# Patient Record
Sex: FEMALE | Race: WHITE | URBAN - NONMETROPOLITAN AREA
[De-identification: names, ages, dates, MRNs, and addresses within clinical notes are randomized per-mention and may not be internally consistent; named-entity substitution may affect disease eponyms.]

---

## 2023-09-13 ENCOUNTER — OFFICE VISIT (OUTPATIENT)
Dept: URBAN - NONMETROPOLITAN AREA CLINIC 2 | Facility: CLINIC | Age: 53
End: 2023-09-13

## 2023-09-27 ENCOUNTER — LAB OUTSIDE AN ENCOUNTER (OUTPATIENT)
Dept: URBAN - NONMETROPOLITAN AREA CLINIC 2 | Facility: CLINIC | Age: 53
End: 2023-09-27

## 2023-09-27 ENCOUNTER — OFFICE VISIT (OUTPATIENT)
Dept: URBAN - NONMETROPOLITAN AREA CLINIC 13 | Facility: CLINIC | Age: 53
End: 2023-09-27
Payer: COMMERCIAL

## 2023-09-27 ENCOUNTER — LAB OUTSIDE AN ENCOUNTER (OUTPATIENT)
Dept: URBAN - NONMETROPOLITAN AREA CLINIC 13 | Facility: CLINIC | Age: 53
End: 2023-09-27

## 2023-09-27 ENCOUNTER — WEB ENCOUNTER (OUTPATIENT)
Dept: URBAN - NONMETROPOLITAN AREA CLINIC 13 | Facility: CLINIC | Age: 53
End: 2023-09-27

## 2023-09-27 VITALS
WEIGHT: 127.3 LBS | TEMPERATURE: 98.6 F | BODY MASS INDEX: 23.42 KG/M2 | HEIGHT: 62 IN | SYSTOLIC BLOOD PRESSURE: 110 MMHG | DIASTOLIC BLOOD PRESSURE: 70 MMHG | HEART RATE: 82 BPM

## 2023-09-27 DIAGNOSIS — K51.218 ULCERATIVE PROCTITIS WITH OTHER COMPLICATION: ICD-10-CM

## 2023-09-27 DIAGNOSIS — E53.8 VITAMIN B12 DEFICIENCY: ICD-10-CM

## 2023-09-27 DIAGNOSIS — Z12.11 COLON CANCER SCREENING: ICD-10-CM

## 2023-09-27 PROBLEM — 190634004: Status: ACTIVE | Noted: 2023-09-27

## 2023-09-27 PROCEDURE — 99204 OFFICE O/P NEW MOD 45 MIN: CPT | Performed by: NURSE PRACTITIONER

## 2023-09-27 PROCEDURE — 99244 OFF/OP CNSLTJ NEW/EST MOD 40: CPT | Performed by: NURSE PRACTITIONER

## 2023-09-27 RX ORDER — MESALAMINE 1.2 G/1
2 TABLETS WITH A MEAL TABLET, DELAYED RELEASE ORAL ONCE A DAY
Qty: 180 TABLET | Refills: 3

## 2023-09-27 RX ORDER — SODIUM PICOSULFATE, MAGNESIUM OXIDE, AND ANHYDROUS CITRIC ACID 12; 3.5; 1 G/175ML; G/175ML; MG/175ML
175 ML THE FIRST DOSE THE EVENING BEFORE AND SECOND DOSE THE MORNING OF COLONOSCOPY LIQUID ORAL ONCE A DAY
Qty: 350 MILLILITER | Refills: 0 | OUTPATIENT
Start: 2023-09-27 | End: 2023-09-29

## 2023-09-27 NOTE — HPI-TODAY'S VISIT:
9/27/2023 Stefania is a 53-year-old female referred to me by Dr. Iggy Roger for evaluation of history of ulcerative proctitis/colitis and elevated bilirubin.  A copy this note be sent to the referring physician.  In 2014 she had a incomplete colonoscopy by Dr. Navarro due to lack of ability to sedate her with proctitis.  Her biopsies show chronicity and she was diagnosed with ulcerative colitis/ulcerative proctitis.  She was recommended to start mesalamine.  She did not follow-up with Dr. Navarro and her mother works for Dr. Jenkins so he did her last 2 colonoscopies.  Her last one was done around 5 years ago normal.  She is on mesalamine 2 pills daily.  She had no flares of her proctitis with no bleeding or pain.  She has alternating constipation and diarrhea at times.  She is predominantly constipated.  She states this is her baseline and she does not take anything regularly for this.  Recently her labs show B12 deficiency and elevated T bilirubin.  She had repeat labs done with normal bilirubin, she has not had any abdominal imaging.  Today she is here to follow-up on ulcerative colitis/proctitis and elevated T bilirubin.  MB

## 2023-09-28 LAB
ALBUMIN: 4.3
BASO (ABSOLUTE): 0
BASOS: 0
BUN/CREATININE RATIO: 21
BUN: 15
CALCIUM: 9.2
CARBON DIOXIDE, TOTAL: 23
CHLORIDE: 102
CREATININE: 0.71
EGFR: 102
EOS (ABSOLUTE): 0.1
EOS: 2
GLUCOSE: 76
HEMATOCRIT: 38.6
HEMATOLOGY COMMENTS:: (no result)
HEMOGLOBIN: 12.3
IMMATURE CELLS: (no result)
IMMATURE GRANS (ABS): 0
IMMATURE GRANULOCYTES: 0
LYMPHS (ABSOLUTE): 1.9
LYMPHS: 36
Lab: (no result)
MCH: 28.7
MCHC: 31.9
MCV: 90
MONOCYTES(ABSOLUTE): 0.5
MONOCYTES: 10
NEUTROPHILS (ABSOLUTE): 2.9
NEUTROPHILS: 52
NRBC: (no result)
PHOSPHORUS: 3.7
PLATELETS: 297
POTASSIUM: 5
RBC: 4.28
RDW: 13.4
SODIUM: 139
WBC: 5.4

## 2023-10-03 ENCOUNTER — TELEPHONE ENCOUNTER (OUTPATIENT)
Dept: URBAN - NONMETROPOLITAN AREA CLINIC 2 | Facility: CLINIC | Age: 53
End: 2023-10-03

## 2023-10-10 ENCOUNTER — LAB OUTSIDE AN ENCOUNTER (OUTPATIENT)
Dept: URBAN - NONMETROPOLITAN AREA CLINIC 2 | Facility: CLINIC | Age: 53
End: 2023-10-10

## 2023-10-11 LAB
ALBUMIN/GLOBULIN RATIO: 1.8
ALBUMIN: 3.9
ALKALINE PHOSPHATASE: 93
ALT (SGPT): 25
AST (SGOT): 29
BILIRUBIN, DIRECT: 0.2
BILIRUBIN, INDIRECT: 0.7
BILIRUBIN, TOTAL: 0.9
GLOBULIN: 2.2
PROTEIN, TOTAL: 6.1

## 2023-10-31 ENCOUNTER — TELEPHONE ENCOUNTER (OUTPATIENT)
Dept: URBAN - NONMETROPOLITAN AREA CLINIC 2 | Facility: CLINIC | Age: 53
End: 2023-10-31

## 2024-01-04 ENCOUNTER — LAB OUTSIDE AN ENCOUNTER (OUTPATIENT)
Dept: URBAN - NONMETROPOLITAN AREA CLINIC 2 | Facility: CLINIC | Age: 54
End: 2024-01-04

## 2024-01-04 ENCOUNTER — OFFICE VISIT (OUTPATIENT)
Dept: URBAN - METROPOLITAN AREA MEDICAL CENTER 1 | Facility: MEDICAL CENTER | Age: 54
End: 2024-01-04
Payer: COMMERCIAL

## 2024-01-04 DIAGNOSIS — K51.50 CHRONIC LEFT-SIDED ULCERATIVE COLITIS: ICD-10-CM

## 2024-01-04 PROCEDURE — 45380 COLONOSCOPY AND BIOPSY: CPT | Performed by: INTERNAL MEDICINE

## 2024-01-05 LAB
AP CASE REPORT: (no result)
AP FINAL DIAGNOSIS: (no result)
AP GROSS DESCRIPTION: (no result)
AP MICROSCOPIC DESCRIPTION: (no result)

## 2024-02-05 ENCOUNTER — OV EP (OUTPATIENT)
Dept: URBAN - NONMETROPOLITAN AREA CLINIC 2 | Facility: CLINIC | Age: 54
End: 2024-02-05
Payer: COMMERCIAL

## 2024-02-05 VITALS
DIASTOLIC BLOOD PRESSURE: 76 MMHG | TEMPERATURE: 98.7 F | BODY MASS INDEX: 28.71 KG/M2 | WEIGHT: 156 LBS | HEIGHT: 62 IN | SYSTOLIC BLOOD PRESSURE: 119 MMHG | HEART RATE: 93 BPM

## 2024-02-05 DIAGNOSIS — K80.20 GALLSTONES: ICD-10-CM

## 2024-02-05 DIAGNOSIS — E53.8 VITAMIN B12 DEFICIENCY: ICD-10-CM

## 2024-02-05 DIAGNOSIS — R17 ELEVATED BILIRUBIN: ICD-10-CM

## 2024-02-05 DIAGNOSIS — K51.218 ULCERATIVE PROCTITIS WITH OTHER COMPLICATION: ICD-10-CM

## 2024-02-05 DIAGNOSIS — Z12.11 COLON CANCER SCREENING: ICD-10-CM

## 2024-02-05 PROBLEM — 26165005: Status: ACTIVE | Noted: 2023-09-27

## 2024-02-05 PROBLEM — 235919008: Status: ACTIVE | Noted: 2024-02-05

## 2024-02-05 PROBLEM — 305058001: Status: ACTIVE | Noted: 2023-09-27

## 2024-02-05 PROBLEM — 3951002: Status: ACTIVE | Noted: 2023-09-27

## 2024-02-05 PROCEDURE — 99214 OFFICE O/P EST MOD 30 MIN: CPT | Performed by: NURSE PRACTITIONER

## 2024-02-05 RX ORDER — MESALAMINE 1.2 G/1
4 TABLETS TABLET, DELAYED RELEASE ORAL ONCE A DAY
Qty: 360 TABLET | Refills: 3
End: 2025-01-30

## 2024-02-05 RX ORDER — MESALAMINE 1.2 G/1
2 TABLETS WITH A MEAL TABLET, DELAYED RELEASE ORAL ONCE A DAY
Qty: 180 TABLET | Refills: 3 | Status: ON HOLD | COMMUNITY

## 2024-02-05 RX ORDER — MESALAMINE 1000 MG/1
1 SUPPOSITORY AT BEDTIME SUPPOSITORY RECTAL ONCE A DAY
Qty: 30 SUPPOSITORY | Refills: 3 | OUTPATIENT
Start: 2024-02-05 | End: 2024-06-04

## 2024-02-05 NOTE — HPI-TODAY'S VISIT:
9/27/2023 Stefania is a 53-year-old female referred to me by Dr. Iggy Roger for evaluation of history of ulcerative proctitis/colitis and elevated bilirubin.  A copy this note be sent to the referring physician.  In 2014 she had a incomplete colonoscopy by Dr. Navarro due to lack of ability to sedate her with proctitis.  Her biopsies show chronicity and she was diagnosed with ulcerative colitis/ulcerative proctitis.  She was recommended to start mesalamine.  She did not follow-up with Dr. Navarro and her mother works for Dr. Jenkins so he did her last 2 colonoscopies.  Her last one was done around 5 years ago normal.  She is on mesalamine 2 pills daily.  She had no flares of her proctitis with no bleeding or pain.  She has alternating constipation and diarrhea at times.  She is predominantly constipated.  She states this is her baseline and she does not take anything regularly for this.  Recently her labs show B12 deficiency and elevated T bilirubin.  She had repeat labs done with normal bilirubin, she has not had any abdominal imaging.  Today she is here to follow-up on ulcerative colitis/proctitis and elevated T bilirubin.  MB 2/5/2024 The patient presents for colonoscopy follow-up.  Since her last visit her repeat labs are normal bilirubin, her ultrasound shows gallstones.  Her colonoscopy was normal with normal biopsies.  She stopped her mesalamine after her procedure and now she has tenesmus bleeding and mucus.  She agrees to restart low-dose mesalamine and Canasa nightly.  She has no symptoms of her gallstones.  She does she is flaring with her ulcerative proctitis but otherwise doing fairly well.  MB

## 2024-02-05 NOTE — PHYSICAL EXAM MUSCULOSKELETAL:
Marcial Vaca,I'm not sure if you are the right person for this but this pt needs a thyroid ultrasound and if they think it is warranted then she should have an FNA. I am not sure if I ordered this right. Thanks! normal gait and station , full range of motion

## 2024-02-06 LAB
ALBUMIN: 3.9
ALKALINE PHOSPHATASE: 78
ALT (SGPT): 6
AST (SGOT): 12
BASO (ABSOLUTE): 0
BASOS: 1
BILIRUBIN, DIRECT: 0.16
BILIRUBIN, TOTAL: 0.7
BUN/CREATININE RATIO: 25
BUN: 15
CARBON DIOXIDE, TOTAL: 22
CHLORIDE: 104
CREATININE: 0.59
EGFR: 108
EOS (ABSOLUTE): 0.1
EOS: 2
GLUCOSE: 193
HEMATOCRIT: 38.5
HEMATOLOGY COMMENTS:: (no result)
HEMOGLOBIN: 11.8
IMMATURE CELLS: (no result)
IMMATURE GRANS (ABS): 0
IMMATURE GRANULOCYTES: 0
LYMPHS (ABSOLUTE): 1.2
LYMPHS: 26
MCH: 26.8
MCHC: 30.6
MCV: 87
MONOCYTES(ABSOLUTE): 0.4
MONOCYTES: 8
NEUTROPHILS (ABSOLUTE): 3
NEUTROPHILS: 63
NRBC: (no result)
PLATELETS: 336
POTASSIUM: 5
PROTEIN, TOTAL: 5.9
RBC: 4.41
RDW: 13.7
SODIUM: 140
WBC: 4.8

## 2024-05-08 ENCOUNTER — OFFICE VISIT (OUTPATIENT)
Dept: URBAN - NONMETROPOLITAN AREA SURGERY CENTER 1 | Facility: SURGERY CENTER | Age: 54
End: 2024-05-08

## 2024-07-01 ENCOUNTER — OFFICE VISIT (OUTPATIENT)
Dept: URBAN - NONMETROPOLITAN AREA CLINIC 2 | Facility: CLINIC | Age: 54
End: 2024-07-01
Payer: COMMERCIAL

## 2024-07-01 ENCOUNTER — DASHBOARD ENCOUNTERS (OUTPATIENT)
Age: 54
End: 2024-07-01

## 2024-07-01 VITALS
WEIGHT: 146 LBS | TEMPERATURE: 97.1 F | HEIGHT: 62 IN | DIASTOLIC BLOOD PRESSURE: 64 MMHG | HEART RATE: 78 BPM | BODY MASS INDEX: 26.87 KG/M2 | SYSTOLIC BLOOD PRESSURE: 99 MMHG

## 2024-07-01 DIAGNOSIS — E53.8 VITAMIN B12 DEFICIENCY: ICD-10-CM

## 2024-07-01 DIAGNOSIS — K80.20 GALLSTONES: ICD-10-CM

## 2024-07-01 DIAGNOSIS — K51.218 ULCERATIVE PROCTITIS WITH OTHER COMPLICATION: ICD-10-CM

## 2024-07-01 DIAGNOSIS — R17 ELEVATED BILIRUBIN: ICD-10-CM

## 2024-07-01 PROCEDURE — 99214 OFFICE O/P EST MOD 30 MIN: CPT | Performed by: NURSE PRACTITIONER

## 2024-07-01 RX ORDER — MESALAMINE 1.2 G/1
4 TABLETS TABLET, DELAYED RELEASE ORAL ONCE A DAY
Qty: 360 TABLET | Refills: 3 | Status: ACTIVE | COMMUNITY
End: 2025-01-30

## 2024-07-01 NOTE — HPI-TODAY'S VISIT:
9/27/2023 Stefania is a 53-year-old female referred to me by Dr. Iggy Roger for evaluation of history of ulcerative proctitis/colitis and elevated bilirubin.  A copy this note be sent to the referring physician.  In 2014 she had a incomplete colonoscopy by Dr. Navarro due to lack of ability to sedate her with proctitis.  Her biopsies show chronicity and she was diagnosed with ulcerative colitis/ulcerative proctitis.  She was recommended to start mesalamine.  She did not follow-up with Dr. Navarro and her mother works for Dr. Jenkins so he did her last 2 colonoscopies.  Her last one was done around 5 years ago normal.  She is on mesalamine 2 pills daily.  She had no flares of her proctitis with no bleeding or pain.  She has alternating constipation and diarrhea at times.  She is predominantly constipated.  She states this is her baseline and she does not take anything regularly for this.  Recently her labs show B12 deficiency and elevated T bilirubin.  She had repeat labs done with normal bilirubin, she has not had any abdominal imaging.  Today she is here to follow-up on ulcerative colitis/proctitis and elevated T bilirubin.  MB 2/5/2024 The patient presents for colonoscopy follow-up.  Since her last visit her repeat labs are normal bilirubin, her ultrasound shows gallstones.  Her colonoscopy was normal with normal biopsies.  She stopped her mesalamine after her procedure and now she has tenesmus bleeding and mucus.  She agrees to restart low-dose mesalamine and Canasa nightly.  She has no symptoms of her gallstones.  She does she is flaring with her ulcerative proctitis but otherwise doing fairly well.  MB 7/1/2024 Stefania presents for follow-up of UC and [place].  Today she is doing great.  She is on 2 mesalamine daily.  Her bowels are moving regularly.  Her repeat labs are stable.  Today she denies any new GI complaints.  MB

## 2024-07-01 NOTE — PHYSICAL EXAM MUSCULOSKELETAL:
The abscess that was drained today will heal from the bottom up and sides inward.  There are no stitches.    You should not take baths while this is healing.  Showers are encouraged.  Gently clean the area once a day when you shower, using mild soap and water.  If you have a detachable showerhead, feel free to spray the wound with it.    Additionally, you should use a peribottle 2-3 times per day.  Fill it with warm water and spray the area, generally each time you go to the bathroom.  This will clean the wound and should also feel soothing.  Pat the area dry.    Don't apply any topical ointments, creams, or powders to the wound. (You can apply plain vaseline to the biopsy on the left side)    Blood-tinged, pus-like drainage is normal and a good sign that the abscess is draining.    If your doctor prescribed antibiotics, take them as prescribed until they're all gone, even if you feel as though the infection is gone before you finish the medicine.    You should go to the ER for extreme pain, skin that is turning black, foul odor, worsening swelling / redness, or fever.    Otherwise please keep your follow-up appointments with our clinic and contact us in the meantime with questions or concerns.     normal gait and station , full range of motion

## 2024-07-02 ENCOUNTER — TELEPHONE ENCOUNTER (OUTPATIENT)
Dept: URBAN - NONMETROPOLITAN AREA CLINIC 2 | Facility: CLINIC | Age: 54
End: 2024-07-02

## 2024-07-02 LAB
ALBUMIN: 4.3
ALKALINE PHOSPHATASE: 71
ALT (SGPT): 7
AST (SGOT): 13
BASO (ABSOLUTE): 0
BASOS: 1
BILIRUBIN, DIRECT: 0.24
BILIRUBIN, TOTAL: 1
BUN/CREATININE RATIO: 15
BUN: 8
CARBON DIOXIDE, TOTAL: 25
CHLORIDE: 103
CREATININE: 0.54
EGFR: 110
EOS (ABSOLUTE): 0.1
EOS: 2
FOLATE (FOLIC ACID), SERUM: 11.2
GLUCOSE: 73
HEMATOCRIT: 38.9
HEMATOLOGY COMMENTS:: (no result)
HEMOGLOBIN: 12.4
IMMATURE CELLS: (no result)
IMMATURE GRANS (ABS): 0
IMMATURE GRANULOCYTES: 0
LYMPHS (ABSOLUTE): 1.2
LYMPHS: 27
MCH: 28.5
MCHC: 31.9
MCV: 89
MONOCYTES(ABSOLUTE): 0.3
MONOCYTES: 8
NEUTROPHILS (ABSOLUTE): 2.8
NEUTROPHILS: 62
NRBC: (no result)
PLATELETS: 310
POTASSIUM: 4.2
PROTEIN, TOTAL: 6.5
RBC: 4.35
RDW: 13.6
SODIUM: 141
VITAMIN B12: 272
WBC: 4.4

## 2025-01-06 ENCOUNTER — OFFICE VISIT (OUTPATIENT)
Dept: URBAN - NONMETROPOLITAN AREA CLINIC 2 | Facility: CLINIC | Age: 55
End: 2025-01-06
Payer: COMMERCIAL

## 2025-01-06 VITALS
SYSTOLIC BLOOD PRESSURE: 93 MMHG | HEIGHT: 62 IN | DIASTOLIC BLOOD PRESSURE: 60 MMHG | HEART RATE: 73 BPM | BODY MASS INDEX: 23.74 KG/M2 | WEIGHT: 129 LBS

## 2025-01-06 DIAGNOSIS — Z12.11 COLON CANCER SCREENING: ICD-10-CM

## 2025-01-06 DIAGNOSIS — K51.218 ULCERATIVE PROCTITIS WITH OTHER COMPLICATION: ICD-10-CM

## 2025-01-06 DIAGNOSIS — E53.8 VITAMIN B12 DEFICIENCY: ICD-10-CM

## 2025-01-06 DIAGNOSIS — K80.20 GALLSTONES: ICD-10-CM

## 2025-01-06 DIAGNOSIS — R17 ELEVATED BILIRUBIN: ICD-10-CM

## 2025-01-06 DIAGNOSIS — N20.0 KIDNEY STONES: ICD-10-CM

## 2025-01-06 PROBLEM — 95570007: Status: ACTIVE | Noted: 2025-01-06

## 2025-01-06 PROCEDURE — 99214 OFFICE O/P EST MOD 30 MIN: CPT | Performed by: NURSE PRACTITIONER

## 2025-01-06 RX ORDER — MESALAMINE 1.2 G/1
4 TABLETS TABLET, DELAYED RELEASE ORAL ONCE A DAY
Qty: 360 TABLET | Refills: 3 | Status: ACTIVE | COMMUNITY
End: 2025-01-30

## 2025-07-08 ENCOUNTER — WEB ENCOUNTER (OUTPATIENT)
Dept: URBAN - NONMETROPOLITAN AREA CLINIC 2 | Facility: CLINIC | Age: 55
End: 2025-07-08

## 2025-07-08 RX ORDER — MESALAMINE 1.2 G/1
4 TABLETS TABLET, DELAYED RELEASE ORAL ONCE A DAY
Qty: 360 TABLET | Refills: 3 | OUTPATIENT
Start: 2025-07-09

## 2025-07-14 ENCOUNTER — OFFICE VISIT (OUTPATIENT)
Dept: URBAN - NONMETROPOLITAN AREA CLINIC 2 | Facility: CLINIC | Age: 55
End: 2025-07-14